# Patient Record
Sex: FEMALE | URBAN - METROPOLITAN AREA
[De-identification: names, ages, dates, MRNs, and addresses within clinical notes are randomized per-mention and may not be internally consistent; named-entity substitution may affect disease eponyms.]

---

## 2020-06-15 ENCOUNTER — OFFICE VISIT (OUTPATIENT)
Dept: URBAN - METROPOLITAN AREA CLINIC 23 | Facility: CLINIC | Age: 25
End: 2020-06-15

## 2020-06-29 ENCOUNTER — OFFICE VISIT (OUTPATIENT)
Dept: URBAN - METROPOLITAN AREA CLINIC 23 | Facility: CLINIC | Age: 25
End: 2020-06-29
Payer: COMMERCIAL

## 2020-06-29 DIAGNOSIS — R10.32 LEFT LOWER QUADRANT ABDOMINAL PAIN: ICD-10-CM

## 2020-06-29 DIAGNOSIS — R10.13 EPIGASTRIC PAIN: ICD-10-CM

## 2020-06-29 DIAGNOSIS — K59.09 OTHER CONSTIPATION: ICD-10-CM

## 2020-06-29 PROCEDURE — 1036F TOBACCO NON-USER: CPT | Performed by: INTERNAL MEDICINE

## 2020-06-29 PROCEDURE — G8417 CALC BMI ABV UP PARAM F/U: HCPCS | Performed by: INTERNAL MEDICINE

## 2020-06-29 PROCEDURE — 3017F COLORECTAL CA SCREEN DOC REV: CPT | Performed by: INTERNAL MEDICINE

## 2020-06-29 PROCEDURE — G9622 NO UNHEAL ETOH USER: HCPCS | Performed by: INTERNAL MEDICINE

## 2020-06-29 PROCEDURE — 99204 OFFICE O/P NEW MOD 45 MIN: CPT | Performed by: INTERNAL MEDICINE

## 2020-06-29 PROCEDURE — G8427 DOCREV CUR MEDS BY ELIG CLIN: HCPCS | Performed by: INTERNAL MEDICINE

## 2020-06-29 PROCEDURE — G9906 PT RECV TBCO CESS INTERV: HCPCS | Performed by: INTERNAL MEDICINE

## 2020-06-29 PROCEDURE — 99244 OFF/OP CNSLTJ NEW/EST MOD 40: CPT | Performed by: INTERNAL MEDICINE

## 2020-06-29 RX ORDER — OMEPRAZOLE 40 MG/1
1 CAPSULE 30 MINUTES BEFORE MEAL CAPSULE, DELAYED RELEASE ORAL ONCE A DAY
Qty: 90 TABS | Refills: 1 | OUTPATIENT
Start: 2020-06-29

## 2020-06-29 NOTE — HPI-TODAY'S VISIT:
This is a 24-year-old  female who presents with chronic left lower quadrant abdominal pain and epigastric pain.  Intermittent.  Precipitated by eating.  Some nausea but no vomiting.  Denies blood in stool.  No family history of colon cancer/gastric cancer/IBD.  She denies frequent use of NSAIDs.  She is wondering if this is a food allergies.  Her bowel habit typically once every 3 days, varying BSs type III-VI.  Father had gallbladder issues.  She is seeing gynecologist for cyst in the pelvis.  She is getting a type of endoscopy soon.  No previous GI work-up.  No over-the-counter medication she tried.

## 2020-06-29 NOTE — PHYSICAL EXAM HENT:
Head, normocephalic, atraumatic, Face, Face within normal limits, Ears, External ears within normal limits, Nose/Nasopharynx, External nose normal appearance, nares patent, no nasal discharge,  Mouth and Throat, Oral cavity appearance normal, Lips, Appearance normal

## 2020-06-29 NOTE — PHYSICAL EXAM GASTROINTESTINAL
Abdomen , soft, nontender, nondistended , no guarding or rigidity , no masses palpable , normal bowel sounds

## 2020-07-01 LAB
A/G RATIO: 2
ALBUMIN: 4.4
ALKALINE PHOSPHATASE: 70
ALT (SGPT): 15
AST (SGOT): 15
BILIRUBIN, TOTAL: <0.2
BUN/CREATININE RATIO: 15
BUN: 13
C-REACTIVE PROTEIN, QUANT: <1
CALCIUM: 9.3
CARBON DIOXIDE, TOTAL: 19
CHLORIDE: 105
CREATININE: 0.88
EGFR IF AFRICN AM: 108
EGFR IF NONAFRICN AM: 94
GLOBULIN, TOTAL: 2.2
GLUCOSE: 85
H PYLORI BREATH TEST: NEGATIVE
HEMATOCRIT: 41.1
HEMOGLOBIN: 13.5
IMMUNOGLOBULIN A, QN, SERUM: 104
LIPASE: 47
MCH: 30.3
MCHC: 32.8
MCV: 92
NRBC: (no result)
PLATELETS: 174
POTASSIUM: 4.5
PROTEIN, TOTAL: 6.6
RBC: 4.46
RDW: 12.6
SEDIMENTATION RATE-WESTERGREN: 6
SODIUM: 140
T-TRANSGLUTAMINASE (TTG) IGA: <2
WBC: 3.8

## 2020-07-22 LAB — CALPROTECTIN, FECAL: <16

## 2020-07-27 ENCOUNTER — OFFICE VISIT (OUTPATIENT)
Dept: URBAN - METROPOLITAN AREA CLINIC 23 | Facility: CLINIC | Age: 25
End: 2020-07-27
Payer: COMMERCIAL

## 2020-07-27 DIAGNOSIS — K58.1 IRRITABLE BOWEL SYNDROME WITH CONSTIPATION: ICD-10-CM

## 2020-07-27 PROCEDURE — 3017F COLORECTAL CA SCREEN DOC REV: CPT | Performed by: INTERNAL MEDICINE

## 2020-07-27 PROCEDURE — G9622 NO UNHEAL ETOH USER: HCPCS | Performed by: INTERNAL MEDICINE

## 2020-07-27 PROCEDURE — 1036F TOBACCO NON-USER: CPT | Performed by: INTERNAL MEDICINE

## 2020-07-27 PROCEDURE — 99214 OFFICE O/P EST MOD 30 MIN: CPT | Performed by: INTERNAL MEDICINE

## 2020-07-27 PROCEDURE — G8427 DOCREV CUR MEDS BY ELIG CLIN: HCPCS | Performed by: INTERNAL MEDICINE

## 2020-07-27 PROCEDURE — G8420 CALC BMI NORM PARAMETERS: HCPCS | Performed by: INTERNAL MEDICINE

## 2020-07-27 RX ORDER — DICYCLOMINE HYDROCHLORIDE 20 MG/1
1 TABLET TABLET ORAL
Qty: 90 | Refills: 1 | OUTPATIENT
Start: 2020-07-27 | End: 2020-09-25

## 2020-07-27 RX ORDER — OMEPRAZOLE 40 MG/1
1 CAPSULE 30 MINUTES BEFORE MEAL CAPSULE, DELAYED RELEASE ORAL ONCE A DAY
Qty: 90 TABS | Refills: 1 | Status: ACTIVE | COMMUNITY
Start: 2020-06-29

## 2020-07-27 NOTE — HPI-TODAY'S VISIT:
25-year-old  female presents for follow-up of constipation and abdominal pain.  Comprehensive work-up was done which came back negative.  Most likely irritable bowel syndrome constipation predominant.  She has tried Metamucil 1 tablespoon daily and omeprazole 1 tablet daily.  She sees good outcomes with these medications.  She reports daily bowel movements.  Much easier to empty.  Epigastric pain and reflux symptoms also well controlled with omeprazole.  Intermittent sharp pain in the left lower quadrant area still there.

## 2020-07-27 NOTE — PREVIOUS WORKUP REVIEWED
:ENDOSCOPIES:LABS:-Labs 7/16/2020: fecal calprotectin < 16, H. pylori breath test negative, BUN 13, creatinine 0.88, total bilirubin 0.2, alkaline phosphatase 70, AST 15, ALT 15, TTG < 2, , WBC 3.8, hemoglobin 13.5, platelet 174, ESR 6, lipase 47, CRP < 1.IMAGES:

## 2020-08-23 ENCOUNTER — DASHBOARD ENCOUNTERS (OUTPATIENT)
Age: 25
End: 2020-08-23

## 2020-08-23 PROBLEM — 440630006: Status: ACTIVE | Noted: 2020-07-27

## 2022-04-18 ENCOUNTER — OFFICE VISIT (OUTPATIENT)
Dept: URBAN - METROPOLITAN AREA CLINIC 23 | Facility: CLINIC | Age: 27
End: 2022-04-18